# Patient Record
Sex: FEMALE | Race: WHITE | NOT HISPANIC OR LATINO | URBAN - METROPOLITAN AREA
[De-identification: names, ages, dates, MRNs, and addresses within clinical notes are randomized per-mention and may not be internally consistent; named-entity substitution may affect disease eponyms.]

---

## 2020-03-13 ENCOUNTER — EMERGENCY (EMERGENCY)
Facility: HOSPITAL | Age: 35
LOS: 1 days | Discharge: HOME | End: 2020-03-13
Attending: EMERGENCY MEDICINE
Payer: SELF-PAY

## 2020-03-13 VITALS
DIASTOLIC BLOOD PRESSURE: 92 MMHG | TEMPERATURE: 98 F | RESPIRATION RATE: 18 BRPM | WEIGHT: 179.9 LBS | SYSTOLIC BLOOD PRESSURE: 133 MMHG | HEART RATE: 81 BPM | OXYGEN SATURATION: 100 %

## 2020-03-13 PROCEDURE — 99284 EMERGENCY DEPT VISIT MOD MDM: CPT

## 2020-03-13 PROCEDURE — 99283 EMERGENCY DEPT VISIT LOW MDM: CPT | Mod: 25

## 2020-03-13 PROCEDURE — 71046 X-RAY EXAM CHEST 2 VIEWS: CPT | Mod: 26

## 2020-03-13 PROCEDURE — 71046 X-RAY EXAM CHEST 2 VIEWS: CPT

## 2020-03-13 RX ORDER — KETOROLAC TROMETHAMINE 30 MG/ML
30 SYRINGE (ML) INJECTION ONCE
Refills: 0 | Status: DISCONTINUED | OUTPATIENT
Start: 2020-03-13 | End: 2020-03-13

## 2020-03-13 NOTE — ED PROVIDER NOTE - ATTENDING CONTRIBUTION TO CARE
I personally saw the patient with the PA, and completed the key components of the history and physical exam. I then discussed the management plan with the PA.    Pt w/ h/o bronchitis, pw 3 day sneezing/ URI symptoms/ cough x 1 day, associated with some occasional L upper pleuritic pain- no pain otherwise.  No SOB/ dizziness/ lightheadedness. Tolerating PO. No direct contact with COVID 19 patient/ recent travel.   and child also both coughing/sneezing but otherwise well.      Lungs CTA b/l    Will obtain CXR/ order toradol for pain, likely discharge w/ supportive care. Pt and family provided information re: social distancing and return precautions

## 2020-03-13 NOTE — ED PROVIDER NOTE - CLINICAL SUMMARY MEDICAL DECISION MAKING FREE TEXT BOX
well appearing 33 y/o female with URI symptoms, no indication for Covid-19 testing per [facility] guidelines. supportive care, follow up outpatient

## 2020-03-13 NOTE — ED PROVIDER NOTE - NSFOLLOWUPINSTRUCTIONS_ED_ALL_ED_FT
Viral Respiratory Infection    A viral respiratory infection is an illness that affects parts of the body used for breathing, like the lungs, nose, and throat. It is caused by a germ called a virus. Symptoms can include runny nose, coughing, sneezing, fatigue, body aches, sore throat, fever, or headache. Over the counter medicine can be used to manage the symptoms but the infection typically goes away on its own in 5 to 10 days.     SEEK IMMEDIATE MEDICAL CARE IF YOU HAVE ANY OF THE FOLLOWING SYMPTOMS: shortness of breath, chest pain, fever over 10 days, or lightheadedness/dizziness.     Please follow up with your doctor within one week. Return to ER if worsening of symptoms, trouble breathing, or any new concerns    Chest Pain    Chest pain can be caused by many different conditions which may or may not be dangerous. Causes include heartburn, lung infections, heart attack, blood clot in lungs, skin infections, strain or damage to muscle, cartilage, or bones, etc. In addition to a history and physical examination, an electrocardiogram (ECG) or other lab tests may have been performed to determine the cause of your chest pain. Follow up with your primary care provider or with a cardiologist as instructed.     SEEK IMMEDIATE MEDICAL CARE IF YOU HAVE ANY OF THE FOLLOWING SYMPTOMS: worsening chest pain, coughing up blood, unexplained back/neck/jaw pain, severe abdominal pain, dizziness or lightheadedness, fainting, shortness of breath, sweaty or clammy skin, vomiting, or racing heart beat. These symptoms may represent a serious problem that is an emergency. Do not wait to see if the symptoms will go away. Get medical help right away. Call 911 and do not drive yourself to the hospital.

## 2020-03-13 NOTE — ED PROVIDER NOTE - OBJECTIVE STATEMENT
35 y/o female h/o bronchitis in the past presents with 3 days h/o cough, sneezing, and occasional left sided pleuritic pain. No recent travel. No known close contact with Covid-19 PT.

## 2020-03-13 NOTE — ED PROVIDER NOTE - PATIENT PORTAL LINK FT
You can access the FollowMyHealth Patient Portal offered by [facility] by registering at the following website: [email or web address]. By joining [employer]’s FollowMyHealth portal, you will also be able to view your health information using other applications (apps) compatible with our system.

## 2021-11-13 ENCOUNTER — EMERGENCY (EMERGENCY)
Facility: HOSPITAL | Age: 36
LOS: 0 days | Discharge: HOME | End: 2021-11-13
Attending: EMERGENCY MEDICINE
Payer: COMMERCIAL

## 2021-11-13 VITALS
OXYGEN SATURATION: 99 % | TEMPERATURE: 98 F | HEART RATE: 73 BPM | DIASTOLIC BLOOD PRESSURE: 79 MMHG | SYSTOLIC BLOOD PRESSURE: 118 MMHG | RESPIRATION RATE: 17 BRPM

## 2021-11-13 VITALS
HEART RATE: 79 BPM | OXYGEN SATURATION: 100 % | TEMPERATURE: 99 F | DIASTOLIC BLOOD PRESSURE: 89 MMHG | SYSTOLIC BLOOD PRESSURE: 130 MMHG | RESPIRATION RATE: 18 BRPM

## 2021-11-13 DIAGNOSIS — H57.10 OCULAR PAIN, UNSPECIFIED EYE: ICD-10-CM

## 2021-11-13 DIAGNOSIS — R53.1 WEAKNESS: ICD-10-CM

## 2021-11-13 DIAGNOSIS — Z88.0 ALLERGY STATUS TO PENICILLIN: ICD-10-CM

## 2021-11-13 DIAGNOSIS — R51.9 HEADACHE, UNSPECIFIED: ICD-10-CM

## 2021-11-13 LAB
ALBUMIN SERPL ELPH-MCNC: 4 G/DL — SIGNIFICANT CHANGE UP (ref 3.3–5)
ALP SERPL-CCNC: 84 U/L — SIGNIFICANT CHANGE UP (ref 40–120)
ALT FLD-CCNC: 42 U/L — SIGNIFICANT CHANGE UP (ref 12–78)
ANION GAP SERPL CALC-SCNC: 5 MMOL/L — SIGNIFICANT CHANGE UP (ref 5–17)
APPEARANCE UR: ABNORMAL
AST SERPL-CCNC: 21 U/L — SIGNIFICANT CHANGE UP (ref 15–37)
BASOPHILS # BLD AUTO: 0.06 K/UL — SIGNIFICANT CHANGE UP (ref 0–0.2)
BASOPHILS NFR BLD AUTO: 0.5 % — SIGNIFICANT CHANGE UP (ref 0–2)
BILIRUB SERPL-MCNC: 0.8 MG/DL — SIGNIFICANT CHANGE UP (ref 0.2–1.2)
BILIRUB UR-MCNC: NEGATIVE — SIGNIFICANT CHANGE UP
BUN SERPL-MCNC: 16 MG/DL — SIGNIFICANT CHANGE UP (ref 7–23)
CALCIUM SERPL-MCNC: 9.6 MG/DL — SIGNIFICANT CHANGE UP (ref 8.5–10.1)
CHLORIDE SERPL-SCNC: 107 MMOL/L — SIGNIFICANT CHANGE UP (ref 96–108)
CO2 SERPL-SCNC: 25 MMOL/L — SIGNIFICANT CHANGE UP (ref 22–31)
COLOR SPEC: YELLOW — SIGNIFICANT CHANGE UP
CREAT SERPL-MCNC: 0.78 MG/DL — SIGNIFICANT CHANGE UP (ref 0.5–1.3)
DIFF PNL FLD: NEGATIVE — SIGNIFICANT CHANGE UP
EOSINOPHIL # BLD AUTO: 0.17 K/UL — SIGNIFICANT CHANGE UP (ref 0–0.5)
EOSINOPHIL NFR BLD AUTO: 1.4 % — SIGNIFICANT CHANGE UP (ref 0–6)
EPI CELLS # UR: ABNORMAL
GLUCOSE SERPL-MCNC: 100 MG/DL — HIGH (ref 70–99)
GLUCOSE UR QL: NEGATIVE MG/DL — SIGNIFICANT CHANGE UP
HCT VFR BLD CALC: 44.4 % — SIGNIFICANT CHANGE UP (ref 34.5–45)
HGB BLD-MCNC: 15.2 G/DL — SIGNIFICANT CHANGE UP (ref 11.5–15.5)
IMM GRANULOCYTES NFR BLD AUTO: 0.2 % — SIGNIFICANT CHANGE UP (ref 0–1.5)
KETONES UR-MCNC: NEGATIVE — SIGNIFICANT CHANGE UP
LEUKOCYTE ESTERASE UR-ACNC: NEGATIVE — SIGNIFICANT CHANGE UP
LYMPHOCYTES # BLD AUTO: 2.66 K/UL — SIGNIFICANT CHANGE UP (ref 1–3.3)
LYMPHOCYTES # BLD AUTO: 22.1 % — SIGNIFICANT CHANGE UP (ref 13–44)
MCHC RBC-ENTMCNC: 31.7 PG — SIGNIFICANT CHANGE UP (ref 27–34)
MCHC RBC-ENTMCNC: 34.2 GM/DL — SIGNIFICANT CHANGE UP (ref 32–36)
MCV RBC AUTO: 92.7 FL — SIGNIFICANT CHANGE UP (ref 80–100)
MONOCYTES # BLD AUTO: 0.67 K/UL — SIGNIFICANT CHANGE UP (ref 0–0.9)
MONOCYTES NFR BLD AUTO: 5.6 % — SIGNIFICANT CHANGE UP (ref 2–14)
NEUTROPHILS # BLD AUTO: 8.44 K/UL — HIGH (ref 1.8–7.4)
NEUTROPHILS NFR BLD AUTO: 70.2 % — SIGNIFICANT CHANGE UP (ref 43–77)
NITRITE UR-MCNC: NEGATIVE — SIGNIFICANT CHANGE UP
PH UR: 5 — SIGNIFICANT CHANGE UP (ref 5–8)
PLATELET # BLD AUTO: 271 K/UL — SIGNIFICANT CHANGE UP (ref 150–400)
POTASSIUM SERPL-MCNC: 4.1 MMOL/L — SIGNIFICANT CHANGE UP (ref 3.5–5.3)
POTASSIUM SERPL-SCNC: 4.1 MMOL/L — SIGNIFICANT CHANGE UP (ref 3.5–5.3)
PROT SERPL-MCNC: 8.1 GM/DL — SIGNIFICANT CHANGE UP (ref 6–8.3)
PROT UR-MCNC: NEGATIVE MG/DL — SIGNIFICANT CHANGE UP
RBC # BLD: 4.79 M/UL — SIGNIFICANT CHANGE UP (ref 3.8–5.2)
RBC # FLD: 12.8 % — SIGNIFICANT CHANGE UP (ref 10.3–14.5)
RBC CASTS # UR COMP ASSIST: NEGATIVE /HPF — SIGNIFICANT CHANGE UP (ref 0–4)
SODIUM SERPL-SCNC: 137 MMOL/L — SIGNIFICANT CHANGE UP (ref 135–145)
SP GR SPEC: 1.01 — SIGNIFICANT CHANGE UP (ref 1.01–1.02)
UROBILINOGEN FLD QL: NEGATIVE MG/DL — SIGNIFICANT CHANGE UP
WBC # BLD: 12.03 K/UL — HIGH (ref 3.8–10.5)
WBC # FLD AUTO: 12.03 K/UL — HIGH (ref 3.8–10.5)
WBC UR QL: SIGNIFICANT CHANGE UP

## 2021-11-13 PROCEDURE — 36415 COLL VENOUS BLD VENIPUNCTURE: CPT

## 2021-11-13 PROCEDURE — 80053 COMPREHEN METABOLIC PANEL: CPT

## 2021-11-13 PROCEDURE — 87086 URINE CULTURE/COLONY COUNT: CPT

## 2021-11-13 PROCEDURE — 96375 TX/PRO/DX INJ NEW DRUG ADDON: CPT

## 2021-11-13 PROCEDURE — 81025 URINE PREGNANCY TEST: CPT

## 2021-11-13 PROCEDURE — 99284 EMERGENCY DEPT VISIT MOD MDM: CPT

## 2021-11-13 PROCEDURE — 81001 URINALYSIS AUTO W/SCOPE: CPT

## 2021-11-13 PROCEDURE — 85025 COMPLETE CBC W/AUTO DIFF WBC: CPT

## 2021-11-13 PROCEDURE — 93005 ELECTROCARDIOGRAM TRACING: CPT

## 2021-11-13 PROCEDURE — 99284 EMERGENCY DEPT VISIT MOD MDM: CPT | Mod: 25

## 2021-11-13 PROCEDURE — 93010 ELECTROCARDIOGRAM REPORT: CPT

## 2021-11-13 PROCEDURE — 96374 THER/PROPH/DIAG INJ IV PUSH: CPT

## 2021-11-13 RX ORDER — DIPHENHYDRAMINE HCL 50 MG
25 CAPSULE ORAL ONCE
Refills: 0 | Status: COMPLETED | OUTPATIENT
Start: 2021-11-13 | End: 2021-11-13

## 2021-11-13 RX ORDER — METOCLOPRAMIDE HCL 10 MG
10 TABLET ORAL ONCE
Refills: 0 | Status: COMPLETED | OUTPATIENT
Start: 2021-11-13 | End: 2021-11-13

## 2021-11-13 RX ORDER — SODIUM CHLORIDE 9 MG/ML
1000 INJECTION INTRAMUSCULAR; INTRAVENOUS; SUBCUTANEOUS ONCE
Refills: 0 | Status: COMPLETED | OUTPATIENT
Start: 2021-11-13 | End: 2021-11-13

## 2021-11-13 RX ORDER — KETOROLAC TROMETHAMINE 30 MG/ML
15 SYRINGE (ML) INJECTION ONCE
Refills: 0 | Status: DISCONTINUED | OUTPATIENT
Start: 2021-11-13 | End: 2021-11-13

## 2021-11-13 RX ADMIN — Medication 15 MILLIGRAM(S): at 11:25

## 2021-11-13 RX ADMIN — Medication 25 MILLIGRAM(S): at 11:23

## 2021-11-13 RX ADMIN — Medication 10 MILLIGRAM(S): at 11:23

## 2021-11-13 RX ADMIN — SODIUM CHLORIDE 1000 MILLILITER(S): 9 INJECTION INTRAMUSCULAR; INTRAVENOUS; SUBCUTANEOUS at 11:23

## 2021-11-13 NOTE — ED ADULT NURSE NOTE - OBJECTIVE STATEMENT
Pt to ED with eye pain, headache, and weakness in the legs. Pt went to Urgent care yesterday and given eye drops and had CT scan. Pt was sent to ED for further eval. Pt reports occasional "wavy line" in her vision. Pt denies hx of migraines and fever. +Smoker.

## 2021-11-13 NOTE — ED STATDOCS - PROGRESS NOTE DETAILS
Lynette PGY3: pt initially evaluated w/ attending. At this time will d/c patient as lab work unremarkable, urine neg, will refer for neurology follow up. Pt in agreement w/ plan.

## 2021-11-13 NOTE — ED STATDOCS - CLINICAL SUMMARY MEDICAL DECISION MAKING FREE TEXT BOX
Will eval for electrolyte abnormality, will consider complex migraine, no suspicious for meninginitis or vascular pathology. Dispo pending labs, medications, reassessment. Will eval for electrolyte abnormality.  Would also consider complex migraine. Not suspicious for meningitis given lack of fever or meningeal signs on exam.  Not suspicious for vascular pathology. Dispo pending labs, medications, reassessment.

## 2021-11-13 NOTE — ED ADULT TRIAGE NOTE - CHIEF COMPLAINT QUOTE
pt. c/o headache, neck pain, dizziness, face swelling, left eye pain, nausea and balance problems x 2 weeks. pt. states she went to UC was diagnosed with contivitis and then had outpatient CT scan, which was read as normal, PCP states to come to ED.

## 2021-11-13 NOTE — ED STATDOCS - OBJECTIVE STATEMENT
37 y/o female presents to the ED with eye pain, headache, and weakness in the legs. Pt went to Urgent care yesterday and given eye drops and had CT scan. Pt was sent to ED for further eval. Pt reports occasional "wavy line" in her vision. Pt denies hx of migraines and fever. +Smoker. Allergies: Penicillin.

## 2021-11-13 NOTE — ED STATDOCS - PATIENT PORTAL LINK FT
You can access the FollowMyHealth Patient Portal offered by [facility] by registering at the following website: [email or web address]. By joining [employer]’s FollowMyHealth portal, you will also be able to view your health information using other applications (apps) compatible with our system.

## 2021-11-13 NOTE — ED STATDOCS - NSFOLLOWUPINSTRUCTIONS_ED_ALL_ED_FT
Please follow up with your primary care provider for further concerns you may have regarding your general health. Attached you will find your results from today's visit. Continue taking your medications as prescribed and keep your upcoming medical appointments.    You will be contacted in the next several days to arrange follow up with a neurologist.

## 2021-11-15 LAB
CULTURE RESULTS: SIGNIFICANT CHANGE UP
SPECIMEN SOURCE: SIGNIFICANT CHANGE UP

## 2022-05-16 ENCOUNTER — EMERGENCY (EMERGENCY)
Facility: HOSPITAL | Age: 37
LOS: 0 days | Discharge: HOME | End: 2022-05-16
Attending: EMERGENCY MEDICINE
Payer: SELF-PAY

## 2022-05-16 VITALS
HEART RATE: 94 BPM | DIASTOLIC BLOOD PRESSURE: 81 MMHG | OXYGEN SATURATION: 96 % | RESPIRATION RATE: 17 BRPM | TEMPERATURE: 99 F | SYSTOLIC BLOOD PRESSURE: 124 MMHG

## 2022-05-16 VITALS — WEIGHT: 179.9 LBS | HEIGHT: 61 IN

## 2022-05-16 DIAGNOSIS — R51.9 HEADACHE, UNSPECIFIED: ICD-10-CM

## 2022-05-16 DIAGNOSIS — U07.1 COVID-19: ICD-10-CM

## 2022-05-16 DIAGNOSIS — R50.9 FEVER, UNSPECIFIED: ICD-10-CM

## 2022-05-16 DIAGNOSIS — E87.1 HYPO-OSMOLALITY AND HYPONATREMIA: ICD-10-CM

## 2022-05-16 DIAGNOSIS — Z88.0 ALLERGY STATUS TO PENICILLIN: ICD-10-CM

## 2022-05-16 DIAGNOSIS — R42 DIZZINESS AND GIDDINESS: ICD-10-CM

## 2022-05-16 LAB
ALBUMIN SERPL ELPH-MCNC: 4.1 G/DL — SIGNIFICANT CHANGE UP (ref 3.3–5)
ALP SERPL-CCNC: 87 U/L — SIGNIFICANT CHANGE UP (ref 40–120)
ALT FLD-CCNC: 48 U/L — SIGNIFICANT CHANGE UP (ref 12–78)
ANION GAP SERPL CALC-SCNC: 6 MMOL/L — SIGNIFICANT CHANGE UP (ref 5–17)
APPEARANCE UR: ABNORMAL
AST SERPL-CCNC: 26 U/L — SIGNIFICANT CHANGE UP (ref 15–37)
BASOPHILS # BLD AUTO: 0.01 K/UL — SIGNIFICANT CHANGE UP (ref 0–0.2)
BASOPHILS NFR BLD AUTO: 0.1 % — SIGNIFICANT CHANGE UP (ref 0–2)
BILIRUB SERPL-MCNC: 1 MG/DL — SIGNIFICANT CHANGE UP (ref 0.2–1.2)
BILIRUB UR-MCNC: NEGATIVE — SIGNIFICANT CHANGE UP
BUN SERPL-MCNC: 9 MG/DL — SIGNIFICANT CHANGE UP (ref 7–23)
CALCIUM SERPL-MCNC: 9.2 MG/DL — SIGNIFICANT CHANGE UP (ref 8.5–10.1)
CHLORIDE SERPL-SCNC: 103 MMOL/L — SIGNIFICANT CHANGE UP (ref 96–108)
CO2 SERPL-SCNC: 23 MMOL/L — SIGNIFICANT CHANGE UP (ref 22–31)
COLOR SPEC: YELLOW — SIGNIFICANT CHANGE UP
CREAT SERPL-MCNC: 0.75 MG/DL — SIGNIFICANT CHANGE UP (ref 0.5–1.3)
DIFF PNL FLD: ABNORMAL
EGFR: 104 ML/MIN/1.73M2 — SIGNIFICANT CHANGE UP
EOSINOPHIL # BLD AUTO: 0.01 K/UL — SIGNIFICANT CHANGE UP (ref 0–0.5)
EOSINOPHIL NFR BLD AUTO: 0.1 % — SIGNIFICANT CHANGE UP (ref 0–6)
GLUCOSE SERPL-MCNC: 93 MG/DL — SIGNIFICANT CHANGE UP (ref 70–99)
GLUCOSE UR QL: NEGATIVE — SIGNIFICANT CHANGE UP
HCT VFR BLD CALC: 41.9 % — SIGNIFICANT CHANGE UP (ref 34.5–45)
HGB BLD-MCNC: 14.5 G/DL — SIGNIFICANT CHANGE UP (ref 11.5–15.5)
IMM GRANULOCYTES NFR BLD AUTO: 0.4 % — SIGNIFICANT CHANGE UP (ref 0–1.5)
KETONES UR-MCNC: ABNORMAL
LEUKOCYTE ESTERASE UR-ACNC: ABNORMAL
LYMPHOCYTES # BLD AUTO: 0.48 K/UL — LOW (ref 1–3.3)
LYMPHOCYTES # BLD AUTO: 5.4 % — LOW (ref 13–44)
MCHC RBC-ENTMCNC: 31.5 PG — SIGNIFICANT CHANGE UP (ref 27–34)
MCHC RBC-ENTMCNC: 34.6 GM/DL — SIGNIFICANT CHANGE UP (ref 32–36)
MCV RBC AUTO: 90.9 FL — SIGNIFICANT CHANGE UP (ref 80–100)
MONOCYTES # BLD AUTO: 0.87 K/UL — SIGNIFICANT CHANGE UP (ref 0–0.9)
MONOCYTES NFR BLD AUTO: 9.8 % — SIGNIFICANT CHANGE UP (ref 2–14)
NEUTROPHILS # BLD AUTO: 7.49 K/UL — HIGH (ref 1.8–7.4)
NEUTROPHILS NFR BLD AUTO: 84.2 % — HIGH (ref 43–77)
NITRITE UR-MCNC: NEGATIVE — SIGNIFICANT CHANGE UP
PH UR: 5 — SIGNIFICANT CHANGE UP (ref 5–8)
PLATELET # BLD AUTO: 219 K/UL — SIGNIFICANT CHANGE UP (ref 150–400)
POTASSIUM SERPL-MCNC: 4 MMOL/L — SIGNIFICANT CHANGE UP (ref 3.5–5.3)
POTASSIUM SERPL-SCNC: 4 MMOL/L — SIGNIFICANT CHANGE UP (ref 3.5–5.3)
PROT SERPL-MCNC: 8 GM/DL — SIGNIFICANT CHANGE UP (ref 6–8.3)
PROT UR-MCNC: NEGATIVE — SIGNIFICANT CHANGE UP
RBC # BLD: 4.61 M/UL — SIGNIFICANT CHANGE UP (ref 3.8–5.2)
RBC # FLD: 13.2 % — SIGNIFICANT CHANGE UP (ref 10.3–14.5)
SODIUM SERPL-SCNC: 132 MMOL/L — LOW (ref 135–145)
SP GR SPEC: 1.02 — SIGNIFICANT CHANGE UP (ref 1.01–1.02)
UROBILINOGEN FLD QL: NEGATIVE — SIGNIFICANT CHANGE UP
WBC # BLD: 8.9 K/UL — SIGNIFICANT CHANGE UP (ref 3.8–10.5)
WBC # FLD AUTO: 8.9 K/UL — SIGNIFICANT CHANGE UP (ref 3.8–10.5)

## 2022-05-16 PROCEDURE — 87086 URINE CULTURE/COLONY COUNT: CPT

## 2022-05-16 PROCEDURE — 81001 URINALYSIS AUTO W/SCOPE: CPT

## 2022-05-16 PROCEDURE — 93010 ELECTROCARDIOGRAM REPORT: CPT

## 2022-05-16 PROCEDURE — 99283 EMERGENCY DEPT VISIT LOW MDM: CPT

## 2022-05-16 PROCEDURE — 36415 COLL VENOUS BLD VENIPUNCTURE: CPT

## 2022-05-16 PROCEDURE — 80053 COMPREHEN METABOLIC PANEL: CPT

## 2022-05-16 PROCEDURE — 99284 EMERGENCY DEPT VISIT MOD MDM: CPT

## 2022-05-16 PROCEDURE — 85025 COMPLETE CBC W/AUTO DIFF WBC: CPT

## 2022-05-16 PROCEDURE — 93005 ELECTROCARDIOGRAM TRACING: CPT

## 2022-05-16 RX ORDER — ACETAMINOPHEN 500 MG
3 TABLET ORAL
Qty: 120 | Refills: 0
Start: 2022-05-16

## 2022-05-16 RX ORDER — NIRMATRELVIR AND RITONAVIR 150-100 MG
3 KIT ORAL
Qty: 30 | Refills: 0
Start: 2022-05-16 | End: 2022-05-20

## 2022-05-16 RX ORDER — SODIUM CHLORIDE 9 MG/ML
1000 INJECTION INTRAMUSCULAR; INTRAVENOUS; SUBCUTANEOUS ONCE
Refills: 0 | Status: COMPLETED | OUTPATIENT
Start: 2022-05-16 | End: 2022-05-16

## 2022-05-16 RX ORDER — ACETAMINOPHEN 500 MG
1000 TABLET ORAL ONCE
Refills: 0 | Status: COMPLETED | OUTPATIENT
Start: 2022-05-16 | End: 2022-05-16

## 2022-05-16 RX ADMIN — SODIUM CHLORIDE 2000 MILLILITER(S): 9 INJECTION INTRAMUSCULAR; INTRAVENOUS; SUBCUTANEOUS at 19:56

## 2022-05-16 RX ADMIN — Medication 1000 MILLIGRAM(S): at 19:53

## 2022-05-16 NOTE — ED STATDOCS - NS ED ATTENDING STATEMENT MOD
This was a shared visit with the JOVANNI. I reviewed and verified the documentation and independently performed the documented:

## 2022-05-16 NOTE — ED STATDOCS - PROGRESS NOTE DETAILS
37 y/o F with no PMH presents with body aches, subjective fever, chills, sore throat x 3-4 days. Pt tested positive for covid-19 at home. Went to outside , confirmed covid+ on rapid test. Was advised to go to ED for IVF as she appeared dehydrated. PE: Obese, lethargic. Cardiac: s1s2, RRR. lungs: CTAB. Abdomen: NBS x4, soft, nontender. A/P: Covid-19, will check labs, IVF. Offered MAB vs. paxlovid, partient agreeable to paxlovid. - Romeo Weeks PA-C

## 2022-05-16 NOTE — ED STATDOCS - CARE PLAN
Principal Discharge DX:	2019 novel coronavirus disease (COVID-19)  Secondary Diagnosis:	Hyponatremia   1

## 2022-05-16 NOTE — ED ADULT TRIAGE NOTE - CHIEF COMPLAINT QUOTE
sent from urgent care for dizziness and dehydration. Pt tested positive with COVID. Denies chest pain or sob. + fever

## 2022-05-16 NOTE — ED ADULT NURSE NOTE - OBJECTIVE STATEMENT
Pt presents to ED for sore throat, fever/chills, dizziness that started yesterday. Pt took home COVId test which was positive and COVID test at urgent care which was also positive. Pt was advised to come to ED as she was told she wsa dehydrated. Pt denies chest pain, shortness of breath.

## 2022-05-16 NOTE — ED STATDOCS - CLINICAL SUMMARY MEDICAL DECISION MAKING FREE TEXT BOX
will provide fluids. pt reported that she was lightheaded and will check EKG. suspect all symptoms likely related to covid. has no other respiratory symptoms otherwise. will provide meds and reassess

## 2022-05-16 NOTE — ED STATDOCS - PATIENT PORTAL LINK FT
You can access the FollowMyHealth Patient Portal offered by [facility] by registering at the following website: [email or web address]. By joining [employer]’s FollowMyHealth portal, you will also be able to view your health information using other applications (apps) compatible with our system.

## 2022-05-16 NOTE — ED STATDOCS - OBJECTIVE STATEMENT
36 F no reported past medical history here states that sore throat started 3-4 days ago and worsening headache, fever, chills, body aches starting yesterday.  pt took home covid test, positive. pt went to urgent care and confirmed (+) covid and was told she was dehydrated and told to come to ED. no chest pain. no shortness of breath

## 2022-05-17 PROBLEM — Z78.9 OTHER SPECIFIED HEALTH STATUS: Chronic | Status: ACTIVE | Noted: 2021-11-13

## 2022-05-18 LAB
CULTURE RESULTS: SIGNIFICANT CHANGE UP
SPECIMEN SOURCE: SIGNIFICANT CHANGE UP

## 2023-03-14 NOTE — ED ADULT TRIAGE NOTE - TEMPERATURE IN CELSIUS (DEGREES C)
Problem: Safety - Adult  Goal: Free from fall injury  Outcome: Progressing     Problem: Pain  Goal: Verbalizes/displays adequate comfort level or baseline comfort level  Outcome: Progressing 36.6

## 2023-03-31 ENCOUNTER — EMERGENCY (EMERGENCY)
Facility: HOSPITAL | Age: 38
LOS: 0 days | Discharge: HOME | End: 2023-03-31
Attending: EMERGENCY MEDICINE
Payer: COMMERCIAL

## 2023-03-31 VITALS
DIASTOLIC BLOOD PRESSURE: 81 MMHG | OXYGEN SATURATION: 97 % | RESPIRATION RATE: 18 BRPM | SYSTOLIC BLOOD PRESSURE: 119 MMHG | HEART RATE: 68 BPM | TEMPERATURE: 98 F

## 2023-03-31 VITALS — WEIGHT: 188.94 LBS

## 2023-03-31 DIAGNOSIS — Q43.8 OTHER SPECIFIED CONGENITAL MALFORMATIONS OF INTESTINE: ICD-10-CM

## 2023-03-31 DIAGNOSIS — R19.7 DIARRHEA, UNSPECIFIED: ICD-10-CM

## 2023-03-31 DIAGNOSIS — Z88.0 ALLERGY STATUS TO PENICILLIN: ICD-10-CM

## 2023-03-31 DIAGNOSIS — R11.0 NAUSEA: ICD-10-CM

## 2023-03-31 DIAGNOSIS — R10.9 UNSPECIFIED ABDOMINAL PAIN: ICD-10-CM

## 2023-03-31 DIAGNOSIS — K63.89 OTHER SPECIFIED DISEASES OF INTESTINE: ICD-10-CM

## 2023-03-31 LAB
ALBUMIN SERPL ELPH-MCNC: 3.4 G/DL — SIGNIFICANT CHANGE UP (ref 3.3–5)
ALP SERPL-CCNC: 69 U/L — SIGNIFICANT CHANGE UP (ref 40–120)
ALT FLD-CCNC: 27 U/L — SIGNIFICANT CHANGE UP (ref 12–78)
ANION GAP SERPL CALC-SCNC: 9 MMOL/L — SIGNIFICANT CHANGE UP (ref 5–17)
AST SERPL-CCNC: 20 U/L — SIGNIFICANT CHANGE UP (ref 15–37)
BASOPHILS # BLD AUTO: 0.05 K/UL — SIGNIFICANT CHANGE UP (ref 0–0.2)
BASOPHILS NFR BLD AUTO: 0.5 % — SIGNIFICANT CHANGE UP (ref 0–2)
BILIRUB SERPL-MCNC: 0.7 MG/DL — SIGNIFICANT CHANGE UP (ref 0.2–1.2)
BUN SERPL-MCNC: 12 MG/DL — SIGNIFICANT CHANGE UP (ref 7–23)
CALCIUM SERPL-MCNC: 8.5 MG/DL — SIGNIFICANT CHANGE UP (ref 8.5–10.1)
CHLORIDE SERPL-SCNC: 109 MMOL/L — HIGH (ref 96–108)
CO2 SERPL-SCNC: 21 MMOL/L — LOW (ref 22–31)
CREAT SERPL-MCNC: 0.6 MG/DL — SIGNIFICANT CHANGE UP (ref 0.5–1.3)
EGFR: 117 ML/MIN/1.73M2 — SIGNIFICANT CHANGE UP
EOSINOPHIL # BLD AUTO: 0.56 K/UL — HIGH (ref 0–0.5)
EOSINOPHIL NFR BLD AUTO: 5.3 % — SIGNIFICANT CHANGE UP (ref 0–6)
GLUCOSE SERPL-MCNC: 69 MG/DL — LOW (ref 70–99)
HCG SERPL-ACNC: <1 MIU/ML — SIGNIFICANT CHANGE UP
HCT VFR BLD CALC: 43.1 % — SIGNIFICANT CHANGE UP (ref 34.5–45)
HGB BLD-MCNC: 15.1 G/DL — SIGNIFICANT CHANGE UP (ref 11.5–15.5)
IMM GRANULOCYTES NFR BLD AUTO: 0.3 % — SIGNIFICANT CHANGE UP (ref 0–0.9)
LIDOCAIN IGE QN: 77 U/L — SIGNIFICANT CHANGE UP (ref 73–393)
LYMPHOCYTES # BLD AUTO: 2.74 K/UL — SIGNIFICANT CHANGE UP (ref 1–3.3)
LYMPHOCYTES # BLD AUTO: 25.9 % — SIGNIFICANT CHANGE UP (ref 13–44)
MCHC RBC-ENTMCNC: 32.5 PG — SIGNIFICANT CHANGE UP (ref 27–34)
MCHC RBC-ENTMCNC: 35 GM/DL — SIGNIFICANT CHANGE UP (ref 32–36)
MCV RBC AUTO: 92.9 FL — SIGNIFICANT CHANGE UP (ref 80–100)
MONOCYTES # BLD AUTO: 0.77 K/UL — SIGNIFICANT CHANGE UP (ref 0–0.9)
MONOCYTES NFR BLD AUTO: 7.3 % — SIGNIFICANT CHANGE UP (ref 2–14)
NEUTROPHILS # BLD AUTO: 6.43 K/UL — SIGNIFICANT CHANGE UP (ref 1.8–7.4)
NEUTROPHILS NFR BLD AUTO: 60.7 % — SIGNIFICANT CHANGE UP (ref 43–77)
PLATELET # BLD AUTO: 248 K/UL — SIGNIFICANT CHANGE UP (ref 150–400)
POTASSIUM SERPL-MCNC: 3.8 MMOL/L — SIGNIFICANT CHANGE UP (ref 3.5–5.3)
POTASSIUM SERPL-SCNC: 3.8 MMOL/L — SIGNIFICANT CHANGE UP (ref 3.5–5.3)
PROT SERPL-MCNC: 6.9 GM/DL — SIGNIFICANT CHANGE UP (ref 6–8.3)
RBC # BLD: 4.64 M/UL — SIGNIFICANT CHANGE UP (ref 3.8–5.2)
RBC # FLD: 13.3 % — SIGNIFICANT CHANGE UP (ref 10.3–14.5)
SODIUM SERPL-SCNC: 139 MMOL/L — SIGNIFICANT CHANGE UP (ref 135–145)
WBC # BLD: 10.58 K/UL — HIGH (ref 3.8–10.5)
WBC # FLD AUTO: 10.58 K/UL — HIGH (ref 3.8–10.5)

## 2023-03-31 PROCEDURE — 74177 CT ABD & PELVIS W/CONTRAST: CPT | Mod: MA

## 2023-03-31 PROCEDURE — 74177 CT ABD & PELVIS W/CONTRAST: CPT | Mod: 26,MA

## 2023-03-31 PROCEDURE — 99284 EMERGENCY DEPT VISIT MOD MDM: CPT

## 2023-03-31 PROCEDURE — 80053 COMPREHEN METABOLIC PANEL: CPT

## 2023-03-31 PROCEDURE — 36415 COLL VENOUS BLD VENIPUNCTURE: CPT

## 2023-03-31 PROCEDURE — 96374 THER/PROPH/DIAG INJ IV PUSH: CPT | Mod: XU

## 2023-03-31 PROCEDURE — 85025 COMPLETE CBC W/AUTO DIFF WBC: CPT

## 2023-03-31 PROCEDURE — 83690 ASSAY OF LIPASE: CPT

## 2023-03-31 PROCEDURE — 84702 CHORIONIC GONADOTROPIN TEST: CPT

## 2023-03-31 PROCEDURE — 99284 EMERGENCY DEPT VISIT MOD MDM: CPT | Mod: 25

## 2023-03-31 RX ORDER — IBUPROFEN 200 MG
1 TABLET ORAL
Qty: 20 | Refills: 0
Start: 2023-03-31 | End: 2023-04-04

## 2023-03-31 RX ORDER — SODIUM CHLORIDE 9 MG/ML
1000 INJECTION INTRAMUSCULAR; INTRAVENOUS; SUBCUTANEOUS ONCE
Refills: 0 | Status: COMPLETED | OUTPATIENT
Start: 2023-03-31 | End: 2023-03-31

## 2023-03-31 RX ORDER — KETOROLAC TROMETHAMINE 30 MG/ML
30 SYRINGE (ML) INJECTION ONCE
Refills: 0 | Status: DISCONTINUED | OUTPATIENT
Start: 2023-03-31 | End: 2023-03-31

## 2023-03-31 RX ADMIN — SODIUM CHLORIDE 1000 MILLILITER(S): 9 INJECTION INTRAMUSCULAR; INTRAVENOUS; SUBCUTANEOUS at 15:39

## 2023-03-31 RX ADMIN — Medication 30 MILLIGRAM(S): at 16:23

## 2023-03-31 NOTE — ED STATDOCS - PROGRESS NOTE DETAILS
Patient seen and evaluated, ED attending note and orders reviewed, will continue with patient follow up and care -Whit Araiza PA-C labs WNL, CT a/p with Epiploic Appendagitis, will give toradol and dc home with NSAIDs, return precautions given  Whit Araiza PA-C

## 2023-03-31 NOTE — ED STATDOCS - OBJECTIVE STATEMENT
36 y/o female w/ no pertinent PMHx presents to the ED sent in by  for abd pain, nausea, and bloody diarrhea since this morning. Pt states she has had similar pain before and was found to have a colon polyp on outpt US. Pt was found to have blood in her urine at , notes her period did start today. Pt denies hx of diverticulitis but has a family hx of diverticulitis. No other complaints at this time. Allergies: Penicillin.

## 2023-03-31 NOTE — ED STATDOCS - CLINICAL SUMMARY MEDICAL DECISION MAKING FREE TEXT BOX
Pt w/ some abd pain and bloody diarrhea this morning, sent from UC. Will get labs, and Ct to r/o diverticulitis vs colitis. UC did see blood in urine however pt did start period today.

## 2023-03-31 NOTE — ED ADULT TRIAGE NOTE - CHIEF COMPLAINT QUOTE
Pt arrives to ED complaining abdominal pain sent in by urgent care. Pain started this morning with nausea and diarrhea. denies medical history.

## 2023-03-31 NOTE — ED STATDOCS - NSFOLLOWUPINSTRUCTIONS_ED_ALL_ED_FT
Epiploic Appendagitis    Epiploic appendagitis (EA) is swelling and irritation of pouches that are attached to the end of the large intestine or colon. These pouches are called epiploic appendages. These pouches contain fat and are attached to the outside of the colon. This condition causes sudden pain in the lower abdomen.    EA is rare, and it usually goes away on its own. It can feel like other abdominal conditions, such as appendicitis, gallbladder attack, or diverticulitis.    What are the causes?  This condition may be caused by:  Blocked blood flow due to a blood clot.  Twisting (torsion) of the epiploic appendages.  Conditions that cause swelling and inflammation of nearby tissue, such as long-term (chronic) diarrhea, Crohn's disease, or ulcerative colitis.  What increases the risk?  You are more likely to develop this condition if:  You are 40–50 years old.  You are male.  You are overweight.  You eat large meals.  What are the signs or symptoms?  The most common symptom of this condition is lower abdominal pain that starts suddenly and can be severe. Pain can be anywhere in the lower abdomen, but it is more common on the left side. The pain may get worse with movement or when pressing on your abdomen.    The following symptoms are possible, but they are not common:  Fever.  Nausea or vomiting.  Diarrhea or constipation.  How is this diagnosed?  Your health care provider may suspect EA if you have sudden lower abdominal pain without other symptoms. The condition may be diagnosed based on:  CT scan. This is the best way to diagnose EA.  Your symptoms.  A physical exam.  Ultrasound.  A blood test (complete blood count, CBC).  A procedure to check your abdomen using a scope that has a camera (laparoscopy). This may be done to confirm the diagnosis.  How is this treated?  EA usually goes away without treatment. Your health care provider may recommend NSAIDs to reduce pain and inflammation. NSAIDs include aspirin or ibuprofen. In rare cases, you may need surgery to remove the appendages. Surgery is needed if the condition does not improve with other treatments or if it keeps coming back.    Follow these instructions at home:  Take over-the-counter and prescription medicines only as told by your health care provider.  You may need to avoid strenuous exercise. Ask your health care provider what activities are safe for you.  Keep all follow-up visits. This is important.  How is this prevented?  Practice portion control. Eating large meals can put pressure on the intestines, which can lead to epiploic appendagitis.  Maintain a healthy weight.  Contact a health care provider if:  You have a fever or chills.  You develop nausea, vomiting, diarrhea, or constipation.  Your pain gets worse.  Your pain lasts longer than 10 days.  Get help right away if:  You have severe pain that does not get better after you take medicine.  Summary  Epiploic appendagitis (EA) is swelling and irritation of pouches (epiploic appendages) that are attached to the outside of the colon. The colon is the end portion of the large intestine.  EA can feel like other abdominal conditions, such as appendicitis, a gallbladder attack, or diverticulitis.  EA usually goes away without treatment. Your health care provider may recommend NSAIDs to reduce pain and inflammation.  This information is not intended to replace advice given to you by your health care provider. Make sure you discuss any questions you have with your health care provider.

## 2023-05-13 ENCOUNTER — EMERGENCY (EMERGENCY)
Facility: HOSPITAL | Age: 38
LOS: 0 days | Discharge: HOME | End: 2023-05-13
Attending: EMERGENCY MEDICINE
Payer: COMMERCIAL

## 2023-05-13 VITALS
DIASTOLIC BLOOD PRESSURE: 84 MMHG | SYSTOLIC BLOOD PRESSURE: 118 MMHG | OXYGEN SATURATION: 99 % | TEMPERATURE: 98 F | RESPIRATION RATE: 16 BRPM | HEART RATE: 75 BPM

## 2023-05-13 VITALS
HEART RATE: 56 BPM | TEMPERATURE: 98 F | OXYGEN SATURATION: 97 % | DIASTOLIC BLOOD PRESSURE: 80 MMHG | RESPIRATION RATE: 17 BRPM | SYSTOLIC BLOOD PRESSURE: 121 MMHG

## 2023-05-13 DIAGNOSIS — R10.12 LEFT UPPER QUADRANT PAIN: ICD-10-CM

## 2023-05-13 DIAGNOSIS — R05.9 COUGH, UNSPECIFIED: ICD-10-CM
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
## 2023-05-13 DIAGNOSIS — M54.9 DORSALGIA, UNSPECIFIED: ICD-10-CM
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
## 2023-05-13 DIAGNOSIS — Z88.0 ALLERGY STATUS TO PENICILLIN: ICD-10-CM

## 2023-05-13 DIAGNOSIS — R19.5 OTHER FECAL ABNORMALITIES: ICD-10-CM

## 2023-05-13 DIAGNOSIS — S29.011A STRAIN OF MUSCLE AND TENDON OF FRONT WALL OF THORAX, INITIAL ENCOUNTER: ICD-10-CM

## 2023-05-13 PROCEDURE — 99284 EMERGENCY DEPT VISIT MOD MDM: CPT

## 2023-05-13 PROCEDURE — 99283 EMERGENCY DEPT VISIT LOW MDM: CPT

## 2023-05-13 RX ORDER — LIDOCAINE 4 G/100G
1 CREAM TOPICAL ONCE
Refills: 0 | Status: COMPLETED | OUTPATIENT
Start: 2023-05-13 | End: 2023-05-13

## 2023-05-13 RX ORDER — CYCLOBENZAPRINE HYDROCHLORIDE 10 MG/1
1 TABLET, FILM COATED ORAL
Qty: 20 | Refills: 0
Start: 2023-05-13

## 2023-05-13 RX ORDER — IBUPROFEN 200 MG
800 TABLET ORAL ONCE
Refills: 0 | Status: COMPLETED | OUTPATIENT
Start: 2023-05-13 | End: 2023-05-13

## 2023-05-13 RX ORDER — LIDOCAINE 4 G/100G
1 CREAM TOPICAL
Qty: 30 | Refills: 0
Start: 2023-05-13

## 2023-05-13 RX ADMIN — LIDOCAINE 1 PATCH: 4 CREAM TOPICAL at 15:09

## 2023-05-13 RX ADMIN — Medication 800 MILLIGRAM(S): at 15:09

## 2023-05-13 NOTE — ED STATDOCS - PATIENT PORTAL LINK FT
You can access the FollowMyHealth Patient Portal offered by [facility] by registering at the following website: [email or web address]. By joining [employer]’s FollowMyHealth portal, you will also be able to view your health information using other applications (apps) compatible with our system.

## 2023-05-13 NOTE — ED STATDOCS - MUSCULOSKELETAL, MLM
range of motion is not limited and there is no muscle tenderness. range of motion is not limited and there is no muscle tenderness. CHEST: +MSK tenderness left lateral chest wall. Painful ROM. NVI. No deformity. Equal chest expansion and rise.

## 2023-05-13 NOTE — ED STATDOCS - CLINICAL SUMMARY MEDICAL DECISION MAKING FREE TEXT BOX
Left chest wall pain lungs are clear not hypoxic TTP no rash in the area, likely musculoskeletal, will give analgesia, reeval and d/c. Left chest wall pain lungs are clear not hypoxic TTP no rash in the area, likely musculoskeletal, will give analgesia, reeval and d/c.    PA note: Patient re-examined and re-evaluated. Patient feels much better at this time. ED evaluation, Diagnosis and management discussed with the patient in detail. Workup results discussed with ED attending, OK to dc home. Close PMD follow up encouraged, aftercare to assist with scheduling appointment ASAP. Strict ED return instructions discussed in detail and patient given the opportunity to ask any questions about their discharge diagnosis and instructions. Patient verbalized understanding. ~ Jorge Jensen PA-C

## 2023-05-13 NOTE — ED STATDOCS - PROGRESS NOTE DETAILS
PA: Patient is a 38 y/o female with no pertinent PMHx who presents to [facility] c/o positional left flank pain radiating to left side x 3 days s/p coughing. Pt reports loose stools for the last month, has not been able to have BM lately. Pt states the pain hurts trying to move, lift her arms up. DENIES any trauma or fall injury. ~Jorge Jensen PA-C PA note: Patient re-examined and re-evaluated. Patient feels much better at this time. ED evaluation, Diagnosis and management discussed with the patient in detail. Workup results discussed with ED attending, OK to [place] home. Close PMD follow up encouraged, aftercare to assist with scheduling appointment ASAP. Strict ED return instructions discussed in detail and patient given the opportunity to ask any questions about their discharge diagnosis and instructions. Patient verbalized understanding. ~ Jorge Jensen PA-C

## 2023-05-13 NOTE — ED ADULT NURSE NOTE - OBJECTIVE STATEMENT
pt c/o left flank pain radiating to left side x 3 days. denies cp/sob/n/v/d/fever, pt medicated with some relief, awaiting disposition at this time

## 2023-05-13 NOTE — ED STATDOCS - CPE ED MUSC NORM
normal... Minocycline Pregnancy And Lactation Text: This medication is Pregnancy Category D and not consider safe during pregnancy. It is also excreted in breast milk.

## 2023-05-13 NOTE — ED ADULT TRIAGE NOTE - CHIEF COMPLAINT QUOTE
pt c/o left flank pain radiating to left side x 3 days. denies cp/sob/n/v/d/fever. no significnat pmh.

## 2023-05-13 NOTE — ED STATDOCS - NEURO NEGATIVE STATEMENT, MLM
no loss of consciousness, no gait abnormality, no headache, no sensory deficits, and no weakness.
Saleem

## 2023-05-13 NOTE — ED STATDOCS - PHYSICAL EXAMINATION
Constitutional: NAD AAOx3  Eyes: PERRLA EOMI  Head: Normocephalic atraumatic  Mouth: MMM  Cardiac: regular rate  MSK: Tenderness left lateral chest wall no bruising no crepitus.   Resp: Lungs CTAB  GI: Abd s/nt/nd, no rebound or guarding.  Neuro: awake, alert, moving all extremities, cranial nerves 2-12 intact, sensation intact, no dysmetria.  Skin: No rashes Attending: Constitutional: NAD AAOx3  Eyes: PERRLA EOMI  Head: Normocephalic atraumatic  Mouth: MMM  Cardiac: regular rate  MSK: Tenderness left lateral chest wall no bruising no crepitus.   Resp: Lungs CTAB  GI: Abd s/nt/nd, no rebound or guarding.  Neuro: awake, alert, moving all extremities, cranial nerves 2-12 intact, sensation intact, no dysmetria.  Skin: No rashes

## 2023-05-13 NOTE — ED STATDOCS - OBJECTIVE STATEMENT
36 y/o female with no pertinent PMHx presents to the ED c/o positional left flank pain radiating to left side x 3 days s/p coughing. Pt reports loose stools for the last month, has not been able to have BM lately. Pt states the pain hurts trying to move, lift her arms up. Pt has not taken anything for the pain at this time. Pt does not have a regular doctor at this time.

## 2023-05-13 NOTE — ED STATDOCS - ATTENDING APP SHARED VISIT CONTRIBUTION OF CARE
I,Juan Nguyen MD,  performed the initial face to face bedside interview with this patient regarding history of present illness, review of symptoms and relevant past medical, social and family history.  I completed an independent physical examination.  I was the initial provider who evaluated this patient. I have signed out the follow up of any pending tests (i.e. labs, radiological studies) to the ACP.  I have communicated the patient’s plan of care and disposition with the ACP.  The history, relevant review of systems, past medical and surgical history, medical decision making, and physical examination was documented by the scribe in my presence and I attest to the accuracy of the documentation.

## 2024-03-14 NOTE — ED ADULT NURSE NOTE - SUICIDE SCREENING QUESTION 2
Detail Level: Detailed Quality 226: Preventive Care And Screening: Tobacco Use: Screening And Cessation Intervention: Patient screened for tobacco use and is an ex/non-smoker Quality 431: Preventive Care And Screening: Unhealthy Alcohol Use - Screening: Patient not identified as an unhealthy alcohol user when screened for unhealthy alcohol use using a systematic screening method No

## 2024-04-01 NOTE — ED ADULT NURSE NOTE - ALCOHOL PRE SCREEN (AUDIT - C)
Statement Selected Holland Alex.  Internal Medicine  [address], [address]  [place] [address]  Phone: [phone]  Fax: [phone]  Follow Up Time: